# Patient Record
Sex: MALE | Race: WHITE | HISPANIC OR LATINO | Employment: FULL TIME | URBAN - METROPOLITAN AREA
[De-identification: names, ages, dates, MRNs, and addresses within clinical notes are randomized per-mention and may not be internally consistent; named-entity substitution may affect disease eponyms.]

---

## 2021-03-05 ENCOUNTER — HOSPITAL ENCOUNTER (OUTPATIENT)
Dept: RADIOLOGY | Facility: MEDICAL CENTER | Age: 46
End: 2021-03-05
Attending: PHYSICIAN ASSISTANT
Payer: COMMERCIAL

## 2021-03-05 ENCOUNTER — OFFICE VISIT (OUTPATIENT)
Dept: URGENT CARE | Facility: PHYSICIAN GROUP | Age: 46
End: 2021-03-05
Payer: COMMERCIAL

## 2021-03-05 VITALS
RESPIRATION RATE: 20 BRPM | TEMPERATURE: 98.7 F | WEIGHT: 205 LBS | HEART RATE: 102 BPM | SYSTOLIC BLOOD PRESSURE: 108 MMHG | OXYGEN SATURATION: 97 % | HEIGHT: 73 IN | DIASTOLIC BLOOD PRESSURE: 84 MMHG | BODY MASS INDEX: 27.17 KG/M2

## 2021-03-05 DIAGNOSIS — R10.84 GENERALIZED ABDOMINAL PAIN: ICD-10-CM

## 2021-03-05 DIAGNOSIS — R11.14 BILIOUS VOMITING WITH NAUSEA: ICD-10-CM

## 2021-03-05 DIAGNOSIS — K59.00 CONSTIPATION, UNSPECIFIED CONSTIPATION TYPE: ICD-10-CM

## 2021-03-05 DIAGNOSIS — E86.0 DEHYDRATION: ICD-10-CM

## 2021-03-05 PROCEDURE — 99204 OFFICE O/P NEW MOD 45 MIN: CPT | Performed by: PHYSICIAN ASSISTANT

## 2021-03-05 PROCEDURE — 74018 RADEX ABDOMEN 1 VIEW: CPT

## 2021-03-05 RX ORDER — ORAL SEMAGLUTIDE 3 MG/1
TABLET ORAL
COMMUNITY
End: 2021-10-08

## 2021-03-05 RX ORDER — METFORMIN HYDROCHLORIDE 500 MG/1
500 TABLET, EXTENDED RELEASE ORAL 2 TIMES DAILY
COMMUNITY
Start: 2021-01-19 | End: 2021-12-17

## 2021-03-05 RX ORDER — LISINOPRIL 20 MG/1
TABLET ORAL
COMMUNITY
Start: 2021-01-14 | End: 2021-10-22 | Stop reason: SDUPTHER

## 2021-03-05 RX ORDER — ONDANSETRON 4 MG/1
4 TABLET, ORALLY DISINTEGRATING ORAL EVERY 6 HOURS PRN
Qty: 10 TABLET | Refills: 0 | Status: SHIPPED | OUTPATIENT
Start: 2021-03-05 | End: 2021-10-08

## 2021-03-05 RX ORDER — OMEPRAZOLE 40 MG/1
40 CAPSULE, DELAYED RELEASE ORAL DAILY
Qty: 15 CAPSULE | Refills: 0 | Status: SHIPPED | OUTPATIENT
Start: 2021-03-05 | End: 2021-10-08

## 2021-03-05 RX ORDER — INSULIN DEGLUDEC 100 U/ML
INJECTION, SOLUTION SUBCUTANEOUS
COMMUNITY
Start: 2021-01-15 | End: 2021-10-08

## 2021-03-05 RX ORDER — ONDANSETRON 4 MG/1
TABLET, ORALLY DISINTEGRATING ORAL
COMMUNITY
Start: 2021-02-22 | End: 2021-10-08

## 2021-03-05 RX ORDER — POLYETHYLENE GLYCOL 3350 17 G/17G
POWDER, FOR SOLUTION ORAL
COMMUNITY
Start: 2021-02-22 | End: 2021-10-08

## 2021-03-05 ASSESSMENT — ENCOUNTER SYMPTOMS
WHEEZING: 0
SHORTNESS OF BREATH: 0
COUGH: 0
DIARRHEA: 0
CHILLS: 1
PALPITATIONS: 1
ABDOMINAL PAIN: 1
NAUSEA: 1
FEVER: 0
HEADACHES: 0
CONSTIPATION: 1
VOMITING: 1

## 2021-03-05 NOTE — LETTER
March 5, 2021         Patient: Osiel Lam   YOB: 1975   Date of Visit: 3/5/2021           To Whom it May Concern:    Osiel Lam was seen in my clinic on 3/5/2021. Please excuse him from work today.    If you have any questions or concerns, please don't hesitate to call.        Sincerely,           Lucy Jones P.A.-C.  Electronically Signed

## 2021-03-05 NOTE — PROGRESS NOTES
"Subjective:   Osiel Lam is a 45 y.o. male who presents for Other (vomitting (2x days); onset for a month)      HPI  45 y.o. male presents to urgent care with new problem to provider of epigastric abdominal pain, nausea, and vomiting x 2 days.   Reports recent hx of similar. [place] [facility] for new onset diabetes.   Blood sugar was 150 this morning.   Denies hematemesis or blood in stools.   Decreased appetite. Zofran this morning with good relief.   Constipation, last bowel movement was 2 days ago. Took miralax this morning   Denies other associated aggravating or alleviating factors.     Review of Systems   Constitutional: Positive for chills and malaise/fatigue. Negative for fever.   Respiratory: Negative for cough, shortness of breath and wheezing.    Cardiovascular: Positive for palpitations. Negative for chest pain.   Gastrointestinal: Positive for abdominal pain, constipation, nausea and vomiting. Negative for diarrhea.   Neurological: Negative for headaches.       There is no problem list on file for this patient.    History reviewed. No pertinent surgical history.  Social History     Tobacco Use   • Smoking status: Current Every Day Smoker   • Smokeless tobacco: Never Used   Substance Use Topics   • Alcohol use: Not Currently   • Drug use: Not Currently      History reviewed. No pertinent family history.   (Allergies, Medications, & Tobacco/Substance Use were reconciled by the Medical Assistant and reviewed by myself. The family history is prepopulated)     Objective:     /84 (BP Location: Left arm, Patient Position: Sitting, BP Cuff Size: Adult)   Pulse (!) 102   Temp 37.1 °C (98.7 °F) (Temporal)   Resp 20   Ht 1.854 m (6' 1\") Comment: per pt  Wt 93 kg (205 lb) Comment: per pt  SpO2 97%   BMI 27.05 kg/m²     Physical Exam  Vitals reviewed.   Constitutional:       General: He is not in acute distress.     Appearance: Normal appearance. He is " well-developed. He is not ill-appearing or diaphoretic.   HENT:      Head: Normocephalic and atraumatic.      Mouth/Throat:      Mouth: Mucous membranes are moist.      Pharynx: Oropharynx is clear.   Eyes:      General: No scleral icterus.     Conjunctiva/sclera: Conjunctivae normal.   Cardiovascular:      Rate and Rhythm: Normal rate and regular rhythm.      Heart sounds: Normal heart sounds.   Pulmonary:      Effort: Pulmonary effort is normal. No respiratory distress.      Breath sounds: Normal breath sounds.   Abdominal:      General: Abdomen is flat. Bowel sounds are increased.      Palpations: Abdomen is soft.      Tenderness: There is generalized abdominal tenderness.   Musculoskeletal:      Cervical back: Normal range of motion and neck supple.   Skin:     General: Skin is warm and dry.      Coloration: Skin is pale. Skin is not jaundiced.   Neurological:      General: No focal deficit present.      Mental Status: He is alert and oriented to person, place, and time.   Psychiatric:         Mood and Affect: Mood normal.         Behavior: Behavior normal.         Thought Content: Thought content normal.         Judgment: Judgment normal.         Assessment/Plan:     1. Generalized abdominal pain  [id number] VIEW    omeprazole (PRILOSEC) 40 MG delayed-release capsule   2. Bilious vomiting with nausea  ondansetron (ZOFRAN ODT) 4 MG TABLET DISPERSIBLE   3. Constipation, unspecified constipation type     4. Dehydration         Narrative & Impression        3/5/2021 9:26 AM     HISTORY/REASON FOR EXAM:  Abdominal Pain.     TECHNIQUE/EXAM DESCRIPTION AND NUMBER OF VIEWS:  1 view(s) of the abdomen.     COMPARISON: None     FINDINGS:  Nonobstructive bowel gas pattern. Small to moderate amount of stool throughout the colon.  No signs of free air.  No abnormal calcifications projecting over the field of view.  No acute fracture.     IMPRESSION:     Nonobstructive bowel gas pattern. Small to moderate amount of stool  throughout the colon.     Blood glucose was 150 just prior to arrival. Patient took previously prescribed Zofran and states he is feeling much better. No episodes of vomiting in urgent care. Recommend patient drink plenty of fluids and electrolyte replacement. Discussed red flags and reasons to proceed to ED including increased blood glucose, vomiting, fevers, abdominal pain, change in bowel habits, or change in mentation.     Differential diagnosis, natural history, supportive care, and indications for immediate follow-up discussed.    Advised the patient to follow-up with the primary care physician for recheck, reevaluation, and consideration of further management.  Patient verbalized understanding of treatment plan and has no further questions regarding care.     Please note that this dictation was created using voice recognition software. I have made a reasonable attempt to correct obvious errors, but I expect that there are errors of grammar and possibly content that I did not discover before finalizing the note.    This note was electronically signed by Lucy Jones PA-C

## 2021-03-17 ENCOUNTER — OFFICE VISIT (OUTPATIENT)
Dept: URGENT CARE | Facility: PHYSICIAN GROUP | Age: 46
End: 2021-03-17
Payer: COMMERCIAL

## 2021-03-17 VITALS
TEMPERATURE: 97.4 F | WEIGHT: 205 LBS | OXYGEN SATURATION: 96 % | HEIGHT: 73 IN | DIASTOLIC BLOOD PRESSURE: 98 MMHG | BODY MASS INDEX: 27.17 KG/M2 | SYSTOLIC BLOOD PRESSURE: 140 MMHG | RESPIRATION RATE: 14 BRPM | HEART RATE: 82 BPM

## 2021-03-17 DIAGNOSIS — R11.2 CANNABINOID HYPEREMESIS SYNDROME: ICD-10-CM

## 2021-03-17 DIAGNOSIS — E11.9 TYPE 2 DIABETES MELLITUS WITHOUT COMPLICATION, UNSPECIFIED WHETHER LONG TERM INSULIN USE (HCC): ICD-10-CM

## 2021-03-17 DIAGNOSIS — F12.90 CANNABINOID HYPEREMESIS SYNDROME: ICD-10-CM

## 2021-03-17 PROCEDURE — 99214 OFFICE O/P EST MOD 30 MIN: CPT | Performed by: PHYSICIAN ASSISTANT

## 2021-03-17 RX ORDER — PROCHLORPERAZINE MALEATE 5 MG/1
5 TABLET ORAL EVERY 8 HOURS PRN
Qty: 20 TABLET | Refills: 0 | Status: SHIPPED | OUTPATIENT
Start: 2021-03-17 | End: 2021-10-08

## 2021-03-17 ASSESSMENT — ENCOUNTER SYMPTOMS
FEVER: 0
VOMITING: 1
BLOOD IN STOOL: 0
NAUSEA: 1
PALPITATIONS: 0
CHILLS: 0
SPUTUM PRODUCTION: 0
ABDOMINAL PAIN: 1
HEADACHES: 0
NECK PAIN: 0
COUGH: 0
DIZZINESS: 0
MYALGIAS: 0
WHEEZING: 0
CONSTIPATION: 0
BACK PAIN: 0
DIARRHEA: 0
FLANK PAIN: 0

## 2021-03-17 NOTE — PROGRESS NOTES
Subjective:   Osiel Lam is a 45 y.o. male who presents for Emesis (vomiting x2 months)      HPI  45 y.o. male presents to urgent care complaining of nausea, vomiting, and generalized upper abdominal pain x 2 months. Patient was seen in ED about 2 weeks ago and I treated him for similar complaints with zofran and omperazole. Patient was recently diagnosed with type 2 diabetes and is using insulin. He has just established care with PCP and has had 1 follow up appointment since starting insulin treatment prescribed by [facility] emergency department.  Patient takes insulin in the morning.  He said his blood sugars have been consistent so we around 150.  He denies blood in stool or hematemesis.  Patient denies fevers.  No urinary symptoms.  Patient reports history of marijuana use and cigarette smoking daily.   He uses NSAIDs occasionally and does not drink alcohol.  Denies hx of abdominal surgeries.   Denies other associated aggravating or alleviating factors.     Review of Systems   Constitutional: Positive for malaise/fatigue. Negative for chills and fever.   Respiratory: Negative for cough, sputum production and wheezing.    Cardiovascular: Negative for chest pain and palpitations.   Gastrointestinal: Positive for abdominal pain, nausea and vomiting. Negative for blood in stool, constipation, diarrhea and melena.   Genitourinary: Negative for dysuria, flank pain and hematuria.   Musculoskeletal: Negative for back pain, myalgias and neck pain.   Neurological: Negative for dizziness and headaches.       There is no problem list on file for this patient.    History reviewed. No pertinent surgical history.  Social History     Tobacco Use   • Smoking status: Current Every Day Smoker   • Smokeless tobacco: Never Used   Substance Use Topics   • Alcohol use: Not Currently   • Drug use: Yes     Types: Marijuana     Comment: daily      History reviewed. No pertinent family history.   (Allergies, Medications, &  "Tobacco/Substance Use were reconciled by the Medical Assistant and reviewed by myself. The family history is prepopulated)     Objective:     /98 (BP Location: Right arm, Patient Position: Sitting, BP Cuff Size: Adult)   Pulse 82   Temp 36.3 °C (97.4 °F) (Temporal)   Resp 14   Ht 1.854 m (6' 1\")   Wt 93 kg (205 lb)   SpO2 96%   BMI 27.05 kg/m²     Physical Exam  Vitals reviewed.   Constitutional:       General: He is not in acute distress.     Appearance: Normal appearance. He is well-developed. He is ill-appearing.   HENT:      Head: Normocephalic and atraumatic.      Mouth/Throat:      Mouth: Mucous membranes are dry.      Pharynx: Oropharynx is clear.   Eyes:      Conjunctiva/sclera: Conjunctivae normal.   Cardiovascular:      Rate and Rhythm: Normal rate and regular rhythm.      Heart sounds: Normal heart sounds.   Pulmonary:      Effort: Pulmonary effort is normal. No respiratory distress.      Breath sounds: Normal breath sounds.   Abdominal:      General: Abdomen is flat. There is no distension.      Palpations: There is no mass.      Tenderness: There is generalized abdominal tenderness and tenderness in the epigastric area and left upper quadrant. There is no right CVA tenderness, left CVA tenderness, guarding or rebound.   Musculoskeletal:      Cervical back: Normal range of motion and neck supple.   Skin:     General: Skin is warm and dry.      Coloration: Skin is jaundiced and pale.   Neurological:      General: No focal deficit present.      Mental Status: He is alert and oriented to person, place, and time.   Psychiatric:         Mood and Affect: Mood normal.         Behavior: Behavior normal.         Thought Content: Thought content normal.         Judgment: Judgment normal.         Assessment/Plan:     1. Cannabinoid hyperemesis syndrome  REFERRAL TO GASTROENTEROLOGY    prochlorperazine (COMPAZINE) 5 MG Tab    CANCELED: REFERRAL TO GASTROENTEROLOGY   2. Type 2 diabetes mellitus without " complication, unspecified whether long term insulin use (HCC)  REFERRAL TO GASTROENTEROLOGY    CANCELED: REFERRAL TO GASTROENTEROLOGY     Recommend follow up with PCP and GI.   DDX includes gastritis, PUD, upper/lower GI bleeding, adverse medication reaction to insulin, hypo/hyperglycemia, and hyperemesis due to daily use of mariajuana.   Patient has trailed zofran with some relief of vomiting, I will send compazine instead. Recommend he continue to take omeprazole as previously prescribed.  Drink plenty of fluids and rest.  Stop cannabis use.  ED and return precautions given for worsening symptoms or development of symptoms including but not limited to abdominal pain, upper or lower GI bleeding, fevers.    Differential diagnosis, natural history, supportive care, and indications for immediate follow-up discussed.    Advised the patient to follow-up with the primary care physician for recheck, reevaluation, and consideration of further management.  Patient verbalized understanding of treatment plan and has no further questions regarding care.     Please note that this dictation was created using voice recognition software. I have made a reasonable attempt to correct obvious errors, but I expect that there are errors of grammar and possibly content that I did not discover before finalizing the note.    This note was electronically signed by Lucy Jones PA-C

## 2021-03-18 ENCOUNTER — OFFICE VISIT (OUTPATIENT)
Dept: URGENT CARE | Facility: PHYSICIAN GROUP | Age: 46
End: 2021-03-18
Payer: COMMERCIAL

## 2021-03-18 VITALS
SYSTOLIC BLOOD PRESSURE: 110 MMHG | WEIGHT: 192 LBS | BODY MASS INDEX: 25.45 KG/M2 | RESPIRATION RATE: 20 BRPM | TEMPERATURE: 98.2 F | OXYGEN SATURATION: 98 % | HEIGHT: 73 IN | HEART RATE: 90 BPM | DIASTOLIC BLOOD PRESSURE: 88 MMHG

## 2021-03-18 DIAGNOSIS — E11.9 TYPE 2 DIABETES MELLITUS WITHOUT COMPLICATION, UNSPECIFIED WHETHER LONG TERM INSULIN USE (HCC): ICD-10-CM

## 2021-03-18 DIAGNOSIS — R10.13 EPIGASTRIC PAIN: ICD-10-CM

## 2021-03-18 DIAGNOSIS — R11.2 INTRACTABLE VOMITING WITH NAUSEA, UNSPECIFIED VOMITING TYPE: ICD-10-CM

## 2021-03-18 LAB — GLUCOSE BLD-MCNC: 145 MG/DL (ref 70–100)

## 2021-03-18 PROCEDURE — 82962 GLUCOSE BLOOD TEST: CPT | Performed by: PHYSICIAN ASSISTANT

## 2021-03-18 PROCEDURE — 99214 OFFICE O/P EST MOD 30 MIN: CPT | Performed by: PHYSICIAN ASSISTANT

## 2021-03-18 ASSESSMENT — ENCOUNTER SYMPTOMS
NAUSEA: 1
VOMITING: 1
ABDOMINAL PAIN: 1
FEVER: 0
CHILLS: 0

## 2021-03-18 NOTE — PROGRESS NOTES
Subjective:   Osiel Lam is a 45 y.o. male who presents for Emesis (Vomitting( all last night), stomach pain,)        Patient presents with his father today.  He would like to be reevaluated for his vomiting.  He states that he has had symptoms for the last 2 months and has been evaluated twice in urgent care for this.  He was seen yesterday and prescribed Phenergan for symptoms.  Prior to this he was prescribed Zofran.  Despite these medications he was vomiting all evening and is not tolerating oral intake.  He also endorses epigastric pain.  He was recently diagnosed with type 2 diabetes and is currently taking insulin and only insulin for this.  This was initially diagnosed at [facility] ED.  He does use marijuana daily and has history of cannabinoid hyperemesis syndrome.    Review of Systems   Constitutional: Positive for malaise/fatigue. Negative for chills and fever.   Gastrointestinal: Positive for abdominal pain, nausea and vomiting.       PMH:  has no past medical history on file.  MEDS:   Current Outpatient Medications:   •  prochlorperazine (COMPAZINE) 5 MG Tab, Take 1 tablet by mouth every 8 hours as needed for Nausea/Vomiting., Disp: 20 tablet, Rfl: 0  •  lisinopril (PRINIVIL) 20 MG Tab, TAKE 1 TABLET BY MOUTH DAILY, Disp: , Rfl:   •  TRESIBA FLEXTOUCH 100 UNIT/ML Solution Pen-injector, ADMINISTER 10 UNITS UNDER THE SKIN DAILY, Disp: , Rfl:   •  ondansetron (ZOFRAN ODT) 4 MG TABLET DISPERSIBLE, TAKE 1 TABLET BY MOUTH EVERY 8 HOURS AS NEEDED FORNAUSEA AND VOMITING, Disp: , Rfl:   •  polyethylene glycol 3350 (MIRALAX) 17 GM/SCOOP Powder, MIX AND DRINK 17GM BY MOUTH EVERY DAY FOR 14 DAYS, Disp: , Rfl:   •  Semaglutide (RYBELSUS) 3 MG Tab, Take  by mouth., Disp: , Rfl:   •  metFORMIN ER (GLUCOPHAGE XR) 500 MG TABLET SR 24 HR, Take 1,000 mg by mouth., Disp: , Rfl:   •  omeprazole (PRILOSEC) 40 MG delayed-release capsule, Take 1 capsule by mouth every day., Disp: 15 capsule, Rfl: 0  •  ondansetron  "(ZOFRAN ODT) 4 MG TABLET DISPERSIBLE, Take 1 tablet by mouth every 6 hours as needed for Nausea., Disp: 10 tablet, Rfl: 0  ALLERGIES: No Known Allergies  SURGHX: History reviewed. No pertinent surgical history.  SOCHX:  reports that he has been smoking. He has never used smokeless tobacco. He reports previous alcohol use. He reports current drug use. Drug: Marijuana.  FH: Family history was reviewed, no pertinent findings to report   Objective:   /88 (BP Location: Left arm, Patient Position: Sitting, BP Cuff Size: Adult)   Pulse 90   Temp 36.8 °C (98.2 °F) (Temporal)   Resp 20   Ht 1.854 m (6' 1\")   Wt 87.1 kg (192 lb)   SpO2 98%   BMI 25.33 kg/m²   Physical Exam  Vitals reviewed.   Constitutional:       General: He is not in acute distress.     Appearance: Normal appearance. He is well-developed. He is ill-appearing.   HENT:      Head: Normocephalic and atraumatic.      Right Ear: External ear normal.      Left Ear: External ear normal.      Nose: Nose normal.   Eyes:      General: Gaze aligned appropriately.   Cardiovascular:      Rate and Rhythm: Normal rate and regular rhythm.   Pulmonary:      Effort: Pulmonary effort is normal. No respiratory distress.      Breath sounds: No stridor.   Abdominal:      General: Abdomen is flat.      Palpations: Abdomen is soft.      Tenderness: There is abdominal tenderness in the epigastric area. There is no guarding or rebound.   Musculoskeletal:      Cervical back: Neck supple.   Skin:     General: Skin is warm and dry.      Capillary Refill: Capillary refill takes less than 2 seconds.      Coloration: Skin is pale.   Neurological:      Mental Status: He is alert and oriented to person, place, and time.      Comments: CN2-12 grossly intact   Psychiatric:         Speech: Speech normal.         Behavior: Behavior normal.           Results for orders placed or performed in visit on 03/18/21   POCT Glucose   Result Value Ref Range    Glucose - Accu-Ck 145 (A) 70 - " 100 mg/dL       Assessment/Plan:   1. Intractable vomiting with nausea, unspecified vomiting type    2. Epigastric pain    3. Type 2 diabetes mellitus without complication, unspecified whether long term insulin use (HCC)  - POCT Glucose    Patient records reviewed.  He was just seen in clinic yesterday for symptoms.  He was recently diagnosed with type 2 diabetes and is currently on insulin.  Semaglutide and metformin are listed on his medications, but it is not clear if he is still taking these.  He was previously diagnosed with cannabinoid hyperemesis syndrome.  His point-of-care glucose testing is 145 today.    Considerations include but not limited to euglycemic ketoacidosis, pancreatitis, biliary pathologies, cannabinoid hyperemesis syndrome, PUD, H. pylori, gastritis.    Patient is ill-appearing and not tolerating oral intake.  He has had intractable vomiting for the last 24 hours and failed outpatient therapy.  I would like patient to be reevaluated at a higher level of care.  Patient and his father are amenable to this plan will go to [facility] ED for further evaluation.      Differential diagnosis, natural history, supportive care, and indications for immediate follow-up discussed.

## 2021-03-18 NOTE — LETTER
March 18, 2021    To Whom It May Concern:         This is confirmation that Osiel Lam attended his scheduled appointment with Ashvin Beltre P.A.-C. on 3/18/21. Please excuse him from work today.         If you have any questions please do not hesitate to call me at the phone number listed below.    Sincerely,          Ashvin Beltre P.A.-C.  [phone]

## 2021-10-08 ENCOUNTER — OFFICE VISIT (OUTPATIENT)
Dept: MEDICAL GROUP | Facility: PHYSICIAN GROUP | Age: 46
End: 2021-10-08
Payer: COMMERCIAL

## 2021-10-08 VITALS
DIASTOLIC BLOOD PRESSURE: 84 MMHG | HEART RATE: 100 BPM | WEIGHT: 186 LBS | OXYGEN SATURATION: 96 % | SYSTOLIC BLOOD PRESSURE: 132 MMHG | HEIGHT: 72 IN | BODY MASS INDEX: 25.19 KG/M2 | TEMPERATURE: 98.8 F | RESPIRATION RATE: 20 BRPM

## 2021-10-08 DIAGNOSIS — Z23 NEED FOR VACCINATION: ICD-10-CM

## 2021-10-08 DIAGNOSIS — Z79.4 TYPE 2 DIABETES MELLITUS WITHOUT COMPLICATION, WITH LONG-TERM CURRENT USE OF INSULIN (HCC): ICD-10-CM

## 2021-10-08 DIAGNOSIS — E11.9 TYPE 2 DIABETES MELLITUS WITHOUT COMPLICATION, WITH LONG-TERM CURRENT USE OF INSULIN (HCC): ICD-10-CM

## 2021-10-08 DIAGNOSIS — I10 PRIMARY HYPERTENSION: ICD-10-CM

## 2021-10-08 PROCEDURE — 90686 IIV4 VACC NO PRSV 0.5 ML IM: CPT | Performed by: NURSE PRACTITIONER

## 2021-10-08 PROCEDURE — 90471 IMMUNIZATION ADMIN: CPT | Performed by: NURSE PRACTITIONER

## 2021-10-08 PROCEDURE — 99214 OFFICE O/P EST MOD 30 MIN: CPT | Mod: 25 | Performed by: NURSE PRACTITIONER

## 2021-10-08 RX ORDER — INSULIN DEGLUDEC 100 U/ML
INJECTION, SOLUTION SUBCUTANEOUS
Qty: 15 ML | Refills: 1 | Status: SHIPPED | OUTPATIENT
Start: 2021-10-08 | End: 2021-10-22

## 2021-10-08 RX ORDER — SUCRALFATE 1 G/1
TABLET ORAL
COMMUNITY
Start: 2021-08-11 | End: 2021-10-08

## 2021-10-08 ASSESSMENT — PATIENT HEALTH QUESTIONNAIRE - PHQ9: CLINICAL INTERPRETATION OF PHQ2 SCORE: 0

## 2021-10-08 NOTE — LETTER
October 8, 2021    To Whom It May Concern:         This is confirmation that Gulshan A Rendon attended his scheduled appointment with DINORA Granados on 10/08/21. Due to his acute illness he was absent from work on 10/06/21, 10/07/21, and 10/08/21.         If you have any questions please do not hesitate to call me at the phone number listed below.    Sincerely,          GRANT GranadosRCarinaN.  [phone]

## 2021-10-08 NOTE — ASSESSMENT & PLAN NOTE
Chronic, ongoing.  He indicates that he is feeling well and denies any symptoms referable to this diagnoses. Specifically denies chest pain, palpitations, dyspnea, orthopnea, PND or peripheral edema, polyuria, polydipsia, urinary complaints, abdominal complaints, myalgias, numbness, weakness or other related symptoms.   Medications: Metformin and Tresiba.  Glucose at home: Unknown.  Hypoglycemia events: Denies.  Statin: None.  ACEI/ARB: Lisinopril but currently not taking.  ASA: No.  Exercise: None.  Last eye exam: Unknown. Denies visual blurring, double vision, eye pain and floaters.  Last monofilament foot exam: Unknown. Denies foot pain, numbness, calluses, ulcers.  Diabetic complications: none  A1c: No results found for: HBA1C   Due for updated lab work.

## 2021-10-08 NOTE — PROGRESS NOTES
"Subjective  Chief Complaint  Establish care to manage his chronic conditions    History of Present Illness  Gulshan Lam is a 46 y.o. male. This patient is here today to establish care.    Primary hypertension  Currently not taking his prescribed Lisinopril. He states that he decided to stop taking it awhile ago.  Reports diet is \"okay\".   He is not following a low-salt diet.  He is not exercising regularly.  He is not taking baby aspirin daily.   He is not monitoring BP at home.   Denies symptoms low BP: light-headed, tunnel-vision, unusual fatigue, dizziness.  Denies symptoms high BP: pounding headache, visual changes, palpitations, flushed face.   Denies chest pain, shortness of breath, dyspnea on exertion.      Type 2 diabetes mellitus, with long-term current use of insulin (HCC)  Chronic, ongoing.  He indicates that he is feeling well and denies any symptoms referable to this diagnoses. Specifically denies chest pain, palpitations, dyspnea, orthopnea, PND or peripheral edema, polyuria, polydipsia, urinary complaints, abdominal complaints, myalgias, numbness, weakness or other related symptoms.   Medications: Metformin and Tresiba.  Glucose at home: Unknown.  Hypoglycemia events: Denies.  Statin: None.  ACEI/ARB: Lisinopril but currently not taking.  ASA: No.  Exercise: None.  Last eye exam: Unknown. Denies visual blurring, double vision, eye pain and floaters.  Last monofilament foot exam: Unknown. Denies foot pain, numbness, calluses, ulcers.  Diabetic complications: none  A1c: No results found for: HBA1C   Due for updated lab work.      Past Medical History    Allergies: Patient has no known allergies.  History reviewed. No pertinent past medical history.  History reviewed. No pertinent surgical history.  Current Outpatient Medications Ordered in Epic   Medication Sig Dispense Refill   • Insulin Degludec (TRESIBA FLEXTOUCH) 100 UNIT/ML Solution Pen-injector ADMINSTER 10 UNITS UNDER THE SKIN DAILY 15 mL " 1   • lisinopril (PRINIVIL) 20 MG Tab TAKE 1 TABLET BY MOUTH DAILY     • metFORMIN ER (GLUCOPHAGE XR) 500 MG TABLET SR 24 HR Take 500 mg by mouth 2 times a day. Indications: Type 2 Diabetes       No current Epic-ordered facility-administered medications on file.     Family History:    Family History   Problem Relation Age of Onset   • Hypertension Mother    • Diabetes Father    • Hypertension Father    • Lung Disease Neg Hx    • Cancer Neg Hx       Personal/Social History:    Social History     Tobacco Use   • Smoking status: Current Every Day Smoker   • Smokeless tobacco: Current User     Types: Chew   • Tobacco comment: states that he quit 2 days ago.   Vaping Use   • Vaping Use: Never used   Substance Use Topics   • Alcohol use: Not Currently   • Drug use: Not Currently     Social History     Social History Narrative   • Not on file      Review of Systems:     General: Negative for fever/chills and unexpected weight change.    Eyes:  Negative for vision changes, eye pain.   Respiratory:  Negative for cough and dyspnea.     Cardiovascular:  Negative for chest pain and palpitations.   Genitourinary:  Negative for dysuria and hematuria.    Musculoskeletal:  Negative for myalgias.    Skin:  Negative for rash.    Neurological:  Negative for numbness/tingling and headaches.     Objective  Physical Exam:   /84 (BP Location: Left arm, Patient Position: Sitting, BP Cuff Size: Adult)   Pulse 100   Temp 37.1 °C (98.8 °F) (Temporal)   Resp 20   Ht 1.829 m (6')   Wt 84.4 kg (186 lb)   SpO2 96%  Body mass index is 25.23 kg/m².  General:  Alert and oriented.  Well appearing.  NAD.  Head:  Normocephalic.   Eyes:  Eyes conjunctiva clear lids without ptosis, pupils equal and reactive to light accommodation.    Neck: Supple without JVD. No lymphadenopathy.  Pulmonary:  Normal effort.  Clear to ausculation without rales, ronchi, or wheezing.  Cardiovascular:  Regular rate and rhythm without murmur, rubs or gallop.   Radial pulses are intact and equal bilaterally.  Musculoskeletal:  No extremity cyanosis, clubbing, or edema.  Skin:  Warm and dry.  No obvious lesions.  Psych: Normal mood and affect. Alert and oriented x3. Judgment and insight is normal.      Assessment/Plan   1. Primary hypertension  Chronic and ongoing.  Currently not taking his prescribed Lisinopril 20 mg daily.  Discussed with him that he needs to be taking his Lisinopril 20 mg daily. He verbalized understanding and he will start taking Lisinopril 20 mg daily.  Educated on a healthy diet and exercise.  - Comp Metabolic Panel; Future    2. Type 2 diabetes mellitus without complication, with long-term current use of insulin (HCC)  Chronic and ongoing.  He states that he was in the hospital at [facility] in January 2021, he was hospitalize for extremely high blood sugars and diagnosed with Type 2 diabetes. Records will be requested from [facility].  He is needing a refill of his Tresiba Flextouch. Continue to administer 10 units under the skin daily in the morning.  Continue to take Metformin  mg twice a day.  Due for updated labs. Discussed with him that I would like him to get these labs done within the next week since this is the first time he is following up with a PCP about his Diabetes since his initial diagnosis in the hospital in January. He verbalized understanding and will get the lab work done soon.  Referral to Endocrinology placed at this time.  - Insulin Degludec (TRESIBA FLEXTOUCH) 100 UNIT/ML Solution Pen-injector; ADMINSTER 10 UNITS UNDER THE SKIN DAILY  Dispense: 15 mL; Refill: 1  - Comp Metabolic Panel; Future  - HEMOGLOBIN A1C; Future  - Lipid Profile; Future  - Microalbumin Creat Ratio Urine (Lab Collect); Future  - REFERRAL TO ENDOCRINOLOGY    3. Need for vaccination  - INFLUENZA VACCINE QUAD INJ (PF)      Health Maintenance: Discussed with the patient.    Return in about 2 weeks (around 10/22/2021) for F/U Labs.    I have  placed the above orders and discussed them with an approved delegating provider.  The MA is performing the below orders under the direction of Dr. Rochelle Dos Santos MD/.     Please note that this dictation was created using voice recognition software. I have made every reasonable attempt to correct obvious errors, but I expect that there are errors of grammar and possibly content that I did not discover before finalizing the note.    ERA Bledsoe  [facility]

## 2021-10-08 NOTE — LETTER
[facility], [facility]  DINORA Granados  [address]  [facility] [address]  Fax: [phone]   Authorization for Release/Disclosure of   Protected Health Information   Name: CARROLL CORONEL : 1975 SSN: [id number]   Address: [address] [place] [address] Phone:    [phone] (home)    I authorize the entity listed below to release/disclose the PHI below to:   [facility]/DINORA Granados and DINORA Granados   Provider or Entity Name:                                           [facility]    Address   City, [facility], [facility]   Phone:  [phone]    Fax:  [phone]   Reason for request: continuity of care   Information to be released:    [  ] LAST COLONOSCOPY,  including any PATH REPORT and follow-up  [  ] LAST FIT/COLOGUARD RESULT [  ] LAST DEXA  [  ] LAST MAMMOGRAM  [  ] LAST PAP  [  ] LAST LABS [  ] RETINA EXAM REPORT  [  ] IMMUNIZATION RECORDS  [XX] Release all info      [XX] Check here and initial the line next to each item to release ALL health information INCLUDING  _____ Care and treatment for drug and / or alcohol abuse  _____ HIV testing, infection status, or AIDS  _____ Genetic Testing    DATES OF SERVICE OR TIME PERIOD TO BE DISCLOSED: _____________  I understand and acknowledge that:  * This Authorization may be revoked at any time by you in writing, except if your health information has already been used or disclosed.  * Your health information that will be used or disclosed as a result of you signing this authorization could be re-disclosed by the recipient. If this occurs, your re-disclosed health information may no longer be protected by State or Federal laws.  * You may refuse to sign this Authorization. Your refusal will not affect your ability to obtain treatment.  * This Authorization becomes effective upon signing and will  on (date) __________.      If no date is indicated, this Authorization will  one (1)  year from the signature date.    Name: Gulshan Lam    Signature: Continuity of care   Date:     10/8/2021       PLEASE FAX REQUESTED RECORDS BACK TO: [phone]

## 2021-10-08 NOTE — ASSESSMENT & PLAN NOTE
"Currently not taking his prescribed Lisinopril. He states that he decided to stop taking it awhile ago.  Reports diet is \"okay\".   He is not following a low-salt diet.  He is not exercising regularly.  He is not taking baby aspirin daily.   He is not monitoring BP at home.   Denies symptoms low BP: light-headed, tunnel-vision, unusual fatigue, dizziness.  Denies symptoms high BP: pounding headache, visual changes, palpitations, flushed face.   Denies chest pain, shortness of breath, dyspnea on exertion.    "

## 2021-10-11 ENCOUNTER — HOSPITAL ENCOUNTER (OUTPATIENT)
Dept: LAB | Facility: MEDICAL CENTER | Age: 46
End: 2021-10-11
Attending: NURSE PRACTITIONER
Payer: COMMERCIAL

## 2021-10-11 DIAGNOSIS — I10 PRIMARY HYPERTENSION: ICD-10-CM

## 2021-10-11 DIAGNOSIS — E11.9 TYPE 2 DIABETES MELLITUS WITHOUT COMPLICATION, WITH LONG-TERM CURRENT USE OF INSULIN (HCC): ICD-10-CM

## 2021-10-11 DIAGNOSIS — Z79.4 TYPE 2 DIABETES MELLITUS WITHOUT COMPLICATION, WITH LONG-TERM CURRENT USE OF INSULIN (HCC): ICD-10-CM

## 2021-10-11 LAB
ALBUMIN SERPL BCP-MCNC: 4.6 G/DL (ref 3.2–4.9)
ALBUMIN/GLOB SERPL: 1.7 G/DL
ALP SERPL-CCNC: 67 U/L (ref 30–99)
ALT SERPL-CCNC: 12 U/L (ref 2–50)
ANION GAP SERPL CALC-SCNC: 11 MMOL/L (ref 7–16)
AST SERPL-CCNC: 14 U/L (ref 12–45)
BILIRUB SERPL-MCNC: 0.4 MG/DL (ref 0.1–1.5)
BUN SERPL-MCNC: 8 MG/DL (ref 8–22)
CALCIUM SERPL-MCNC: 9.1 MG/DL (ref 8.5–10.5)
CHLORIDE SERPL-SCNC: 104 MMOL/L (ref 96–112)
CHOLEST SERPL-MCNC: 118 MG/DL (ref 100–199)
CO2 SERPL-SCNC: 26 MMOL/L (ref 20–33)
CREAT SERPL-MCNC: 0.69 MG/DL (ref 0.5–1.4)
EST. AVERAGE GLUCOSE BLD GHB EST-MCNC: 111 MG/DL
FASTING STATUS PATIENT QL REPORTED: NORMAL
GLOBULIN SER CALC-MCNC: 2.7 G/DL (ref 1.9–3.5)
GLUCOSE SERPL-MCNC: 119 MG/DL (ref 65–99)
HBA1C MFR BLD: 5.5 % (ref 4–5.6)
HDLC SERPL-MCNC: 46 MG/DL
LDLC SERPL CALC-MCNC: 59 MG/DL
POTASSIUM SERPL-SCNC: 3.9 MMOL/L (ref 3.6–5.5)
PROT SERPL-MCNC: 7.3 G/DL (ref 6–8.2)
SODIUM SERPL-SCNC: 141 MMOL/L (ref 135–145)
TRIGL SERPL-MCNC: 63 MG/DL (ref 0–149)

## 2021-10-11 PROCEDURE — 36415 COLL VENOUS BLD VENIPUNCTURE: CPT

## 2021-10-11 PROCEDURE — 83036 HEMOGLOBIN GLYCOSYLATED A1C: CPT

## 2021-10-11 PROCEDURE — 80061 LIPID PANEL: CPT

## 2021-10-11 PROCEDURE — 80053 COMPREHEN METABOLIC PANEL: CPT

## 2021-10-14 ENCOUNTER — TELEPHONE (OUTPATIENT)
Dept: MEDICAL GROUP | Facility: PHYSICIAN GROUP | Age: 46
End: 2021-10-14

## 2021-10-14 NOTE — TELEPHONE ENCOUNTER
Patient notified and number provided to Endocrinology [facility]

## 2021-10-14 NOTE — TELEPHONE ENCOUNTER
----- Message from DINORA Granados sent at 10/14/2021 11:40 AM PDT -----  Regarding: Endocrinology  Please call the patient and give him the information for Endocrinology for him to schedule an appointment about his Diabetes. It may be awhile until he can get an appointment but I still want him to get an appointment even if it is in 2022.    Thank you,    ERA Bledsoe

## 2021-10-22 ENCOUNTER — OFFICE VISIT (OUTPATIENT)
Dept: MEDICAL GROUP | Facility: PHYSICIAN GROUP | Age: 46
End: 2021-10-22
Payer: COMMERCIAL

## 2021-10-22 ENCOUNTER — OFFICE VISIT (OUTPATIENT)
Dept: URGENT CARE | Facility: CLINIC | Age: 46
End: 2021-10-22
Payer: COMMERCIAL

## 2021-10-22 VITALS
SYSTOLIC BLOOD PRESSURE: 130 MMHG | HEART RATE: 100 BPM | RESPIRATION RATE: 20 BRPM | TEMPERATURE: 97.1 F | BODY MASS INDEX: 25.84 KG/M2 | DIASTOLIC BLOOD PRESSURE: 76 MMHG | WEIGHT: 195 LBS | OXYGEN SATURATION: 97 % | HEIGHT: 73 IN

## 2021-10-22 VITALS
TEMPERATURE: 98.8 F | BODY MASS INDEX: 25.47 KG/M2 | WEIGHT: 188 LBS | DIASTOLIC BLOOD PRESSURE: 72 MMHG | SYSTOLIC BLOOD PRESSURE: 136 MMHG | HEIGHT: 72 IN | RESPIRATION RATE: 16 BRPM | HEART RATE: 86 BPM | OXYGEN SATURATION: 98 %

## 2021-10-22 DIAGNOSIS — R10.9 BILATERAL FLANK PAIN: ICD-10-CM

## 2021-10-22 DIAGNOSIS — G89.29 CHRONIC ABDOMINAL PAIN: ICD-10-CM

## 2021-10-22 DIAGNOSIS — R11.2 NON-INTRACTABLE VOMITING WITH NAUSEA, UNSPECIFIED VOMITING TYPE: ICD-10-CM

## 2021-10-22 DIAGNOSIS — K21.9 GASTROESOPHAGEAL REFLUX DISEASE, UNSPECIFIED WHETHER ESOPHAGITIS PRESENT: ICD-10-CM

## 2021-10-22 DIAGNOSIS — R10.9 CHRONIC ABDOMINAL PAIN: ICD-10-CM

## 2021-10-22 DIAGNOSIS — Z79.4 TYPE 2 DIABETES MELLITUS WITHOUT COMPLICATION, WITH LONG-TERM CURRENT USE OF INSULIN (HCC): ICD-10-CM

## 2021-10-22 DIAGNOSIS — I10 PRIMARY HYPERTENSION: ICD-10-CM

## 2021-10-22 DIAGNOSIS — E11.9 TYPE 2 DIABETES MELLITUS WITHOUT COMPLICATION, WITH LONG-TERM CURRENT USE OF INSULIN (HCC): ICD-10-CM

## 2021-10-22 PROCEDURE — 99213 OFFICE O/P EST LOW 20 MIN: CPT | Performed by: NURSE PRACTITIONER

## 2021-10-22 PROCEDURE — 99214 OFFICE O/P EST MOD 30 MIN: CPT | Performed by: NURSE PRACTITIONER

## 2021-10-22 RX ORDER — LISINOPRIL 20 MG/1
20 TABLET ORAL DAILY
Qty: 90 TABLET | Refills: 1 | Status: SHIPPED | OUTPATIENT
Start: 2021-10-22 | End: 2021-12-17

## 2021-10-22 ASSESSMENT — ENCOUNTER SYMPTOMS
COUGH: 0
VOMITING: 1
SHORTNESS OF BREATH: 0
FLANK PAIN: 1
NAUSEA: 1
ABDOMINAL PAIN: 1
DIARRHEA: 0
PALPITATIONS: 0
HEARTBURN: 1
CONSTIPATION: 0
BLOOD IN STOOL: 0
FEVER: 0
CHILLS: 0

## 2021-10-22 ASSESSMENT — PAIN SCALES - GENERAL: PAINLEVEL: 10=SEVERE PAIN

## 2021-10-22 NOTE — LETTER
[facility]  ED GranadosP.RCarinaN.  [address]  [place] [address]  Fax: [phone]   Authorization for Release/Disclosure of   Protected Health Information   Name: BELKYS CORONEL : 1975 SSN: [id number]   Address: [address] [place] [address]  [place] [address] Phone:    [phone] (home)    I authorize the entity listed below to release/disclose the PHI below to:   [facility]/ABBY Granados.P.R.MAURA and DINORA Granados   Provider or Entity Name:                                              [facility]    Address   [facility], [facility], [facility]    [address] [place] [address]   Phone:   [phone]    Fax:   [phone]   Reason for request: continuity of care   Information to be released:    [  ] LAST COLONOSCOPY,  including any PATH REPORT and follow-up  [  ] LAST FIT/COLOGUARD RESULT [  ] LAST DEXA  [  ] LAST MAMMOGRAM  [  ] LAST PAP  [  ] LAST LABS [  ] RETINA EXAM REPORT  [  ] IMMUNIZATION RECORDS  [XX] Release all info      [XX] Check here and initial the line next to each item to release ALL health information INCLUDING  _____ Care and treatment for drug and / or alcohol abuse  _____ HIV testing, infection status, or AIDS  _____ Genetic Testing    DATES OF SERVICE OR TIME PERIOD TO BE DISCLOSED: _____________  I understand and acknowledge that:  * This Authorization may be revoked at any time by you in writing, except if your health information has already been used or disclosed.  * Your health information that will be used or disclosed as a result of you signing this authorization could be re-disclosed by the recipient. If this occurs, your re-disclosed health information may no longer be protected by State or Federal laws.  * You may refuse to sign this Authorization. Your refusal will not affect your ability to obtain treatment.  * This Authorization becomes effective upon signing and will  on (date) __________.       If no date is indicated, this Authorization will  one (1) year from the signature date.    Name: Bharathi Lam    Signature: Continuity of care   Date:     10/22/2021       PLEASE FAX REQUESTED RECORDS BACK TO: [phone]

## 2021-10-22 NOTE — LETTER
October 22, 2021    To Whom It May Concern:         This is confirmation that Gulshan Lam attended his scheduled appointment with DINORA Granados on 10/22/21.         If you have any questions please do not hesitate to call me at the phone number listed below.    Sincerely,          Daisy Woods A.P.R.N.  [phone]

## 2021-10-22 NOTE — PROGRESS NOTES
Subjective     Gulshan Lam is a 46 y.o. male who presents with GI Problem (stomach pain - )            Gulshan comes in today with a flare of his chronic stomach pain with chronic nausea and vomiting.  He rates generalized abdominal pain 10/10, worst at epigastric region. He reports heartburn.  He has been having a pain flare for 2 days. He has had similar symptoms since childhood. He denies any fever, chills, myalgias, chest pain or shortness of breath.  He notes mild bilateral flank pain with no dysuria or hematuria.  He denies any diarrhea, bloody or tarry stools.  He reports that in the past he has tried GI cocktail, phenergan, zofran and heartburn medications with no relief.  He reports that insulin and blood pressure medication improves his symptoms historically.  He reports his blood sugars average 130.  He reports that he had upper GI and colonoscopy in the past with no explanation for his symptoms.  He smokes pot occasionally.  He has been evaluated for possible hyperemesis and GI pain secondary to marijuana use, but reports the he discontinued marijuana for 1 year and his symptoms persisted without change.  He is scheduled to see his PCP later today for same concern.  He is not currently established with GI.      Review of Systems   Constitutional: Negative for chills, fever and malaise/fatigue.   Respiratory: Negative for cough and shortness of breath.    Cardiovascular: Negative for chest pain and palpitations.   Gastrointestinal: Positive for abdominal pain, heartburn, nausea and vomiting. Negative for blood in stool, constipation, diarrhea and melena.   Genitourinary: Positive for flank pain. Negative for dysuria, frequency, hematuria and urgency.     Medications, Allergies, and current problem list reviewed today in Epic         Objective     Blood Pressure 136/72   Pulse 86   Temperature 37.1 °C (98.8 °F) (Temporal)   Respiration 16   Height 1.829 m (6')   Weight 85.3 kg (188 lb)    Oxygen Saturation 98%   Body Mass Index 25.50 kg/m²      Physical Exam  Vitals reviewed.   Constitutional:       Appearance: Normal appearance. He is well-developed. He is not ill-appearing, toxic-appearing or diaphoretic.      Comments: Gulshan is gripping his stomach, rocking back and forth in pain, and wincing.     Eyes:      General: No scleral icterus.        Right eye: No discharge.         Left eye: No discharge.      Conjunctiva/sclera: Conjunctivae normal.   Neck:      Thyroid: No thyromegaly.      Vascular: No JVD.      Trachea: No tracheal deviation.   Cardiovascular:      Rate and Rhythm: Normal rate and regular rhythm.      Heart sounds: Normal heart sounds. No murmur heard.   No friction rub. No gallop.    Pulmonary:      Effort: Pulmonary effort is normal. No respiratory distress.      Breath sounds: Normal breath sounds. No stridor. No wheezing, rhonchi or rales.   Chest:      Chest wall: No tenderness.   Abdominal:      General: Abdomen is flat. Bowel sounds are normal.      Palpations: Abdomen is soft.      Tenderness: There is generalized abdominal tenderness. There is right CVA tenderness and left CVA tenderness. There is no guarding. Negative signs include Calero's sign and McBurney's sign.   Musculoskeletal:      Cervical back: Neck supple.   Lymphadenopathy:      Cervical: No cervical adenopathy.   Skin:     General: Skin is warm and dry.      Coloration: Skin is not pale.      Findings: No erythema or rash.   Neurological:      Mental Status: He is alert and oriented to person, place, and time.   Psychiatric:         Mood and Affect: Mood normal.                             Assessment & Plan        1. Chronic abdominal pain     2. Gastroesophageal reflux disease, unspecified whether esophagitis present     3. Non-intractable vomiting with nausea, unspecified vomiting type     4. Bilateral flank pain       Discussed exam findings with Gulshan.  Differential reviewed.  Etiology unclear.   "Discussed available diagnostics and treatment available in the urgent care.  He reports that \"none of that ever works\".  He declines urinalysis, GI cocktail, zofran and phenergan.  He is distressed.  Recommended further evaluation and care in the ED for acute abdominal symptoms.  He verbalized understanding of and agreed with plan of care.  He will travel via private car to [facility] ED.                 "

## 2021-10-23 NOTE — ASSESSMENT & PLAN NOTE
"Currently not taking his prescribed Lisinopril.   Reports diet is \"okay\".   He is not following a low-salt diet.  He is not exercising regularly.  He is not taking baby aspirin daily.   He is not monitoring BP at home.   Denies symptoms low BP: light-headed, tunnel-vision, unusual fatigue, dizziness.  Denies symptoms high BP: pounding headache, visual changes, palpitations, flushed face.   Denies chest pain, shortness of breath, dyspnea on exertion.     "

## 2021-10-23 NOTE — PROGRESS NOTES
"Subjective  Chief Complaint  Lab Results    History of Present Illness  Gulshan Lam is a 46 y.o. male. This established patient is here today to go over lab results.    Primary hypertension  Currently not taking his prescribed Lisinopril.   Reports diet is \"okay\".   He is not following a low-salt diet.  He is not exercising regularly.  He is not taking baby aspirin daily.   He is not monitoring BP at home.   Denies symptoms low BP: light-headed, tunnel-vision, unusual fatigue, dizziness.  Denies symptoms high BP: pounding headache, visual changes, palpitations, flushed face.   Denies chest pain, shortness of breath, dyspnea on exertion.       Type 2 diabetes mellitus, with long-term current use of insulin (HCC)  Chronic, controlled.  He indicates that he is feeling well and denies any symptoms referable to this diagnoses. Specifically denies chest pain, palpitations, dyspnea, orthopnea, PND or peripheral edema, polyuria, polydipsia, urinary complaints, abdominal complaints, myalgias, numbness, weakness or other related symptoms.   Medications: Tresiba and Metformin  mg BID.  Glucose at home: Unknown.  Hypoglycemia events: Denies.  Statin: None.  ACEI/ARB: Lisinopril but currently not taking.  ASA: No.  Exercise: None.  Last eye exam: Unknown. Denies visual blurring, double vision, eye pain and floaters.  Last monofilament foot exam: Unknown. Denies foot pain, numbness, calluses, ulcers.  Diabetic complications: none  A1c:   Lab Results   Component Value Date/Time    HBA1C 5.5 10/11/2021 08:06 AM         Past Medical History    Allergies: Patient has no known allergies.  History reviewed. No pertinent past medical history.  History reviewed. No pertinent surgical history.  Current Outpatient Medications Ordered in Epic   Medication Sig Dispense Refill   • lisinopril (PRINIVIL) 20 MG Tab Take 1 Tablet by mouth every day. 90 Tablet 1   • metFORMIN ER (GLUCOPHAGE XR) 500 MG TABLET SR 24 HR Take 500 mg by " "mouth 2 times a day. Indications: Type 2 Diabetes       No current Epic-ordered facility-administered medications on file.     Family History:    Family History   Problem Relation Age of Onset   • Hypertension Mother    • Diabetes Father    • Hypertension Father    • Lung Disease Neg Hx    • Cancer Neg Hx       Personal/Social History:    Social History     Tobacco Use   • Smoking status: Current Every Day Smoker   • Smokeless tobacco: Current User     Types: Chew   • Tobacco comment: states that he quit 2 days ago.   Vaping Use   • Vaping Use: Never used   Substance Use Topics   • Alcohol use: Not Currently   • Drug use: Not Currently     Social History     Social History Narrative   • Not on file      Review of Systems:   General: Negative for fever/chills and unexpected weight change.   Eyes:  Negative for vision changes, eye pain.  Respiratory:  Negative for cough and dyspnea.    Cardiovascular:  Negative for chest pain and palpitations.  Genitourinary:  Negative for dysuria and hematuria.   Musculoskeletal:  Negative for myalgias.   Skin:  Negative for rash.   Neurological:  Negative for numbness/tingling and headaches.     Objective  Physical Exam:   /76 (BP Location: Right arm, Patient Position: Sitting, BP Cuff Size: Adult)   Pulse 100   Temp 36.2 °C (97.1 °F) (Temporal)   Resp 20   Ht 1.854 m (6' 1\")   Wt 88.5 kg (195 lb)   SpO2 97%  Body mass index is 25.73 kg/m².  General:  Alert and oriented.  Well appearing.  NAD  Neck: Supple without JVD. No lymphadenopathy.  Pulmonary:  Normal effort.  Clear to ausculation without rales, ronchi, or wheezing.  Cardiovascular:  Regular rate and rhythm without murmur, rubs or gallop.   Skin:  Warm and dry.  No obvious lesions.  Musculoskeletal:  No extremity cyanosis, clubbing, or edema.      Diagnostic Testing/Findings:  Labs:     10/11/2021 08:06   Sodium 141   Potassium 3.9   Chloride 104   Co2 26   Anion Gap 11.0   Glucose 119 (H)   Bun 8   Creatinine " 0.69   GFR If  >60   GFR If Non African American >60   Calcium 9.1   AST(SGOT) 14   ALT(SGPT) 12   Alkaline Phosphatase 67   Total Bilirubin 0.4   Albumin 4.6   Total Protein 7.3   Globulin 2.7   A-G Ratio 1.7   Glycohemoglobin 5.5   Estim. Avg Glu 111   Fasting Status Fasting   Cholesterol,Tot 118   Triglycerides 63   HDL 46   LDL 59       Assessment/Plan  1. Type 2 diabetes mellitus without complication, with long-term current use of insulin (HCC)  Chronic and controlled.  Discussed with him that his most recent A1C was 5.5.  At this point in time I would like him to stop taking Tresiba insulin, he verbalized understanding.  Educated to continue take Metformin  mg twice a day.  Educated on healthy diet and exercise.  Discussed with him that I am still waiting medical records to see what his original presentation to [facility] was that started this current diagnosis. He appears at times to be a poor historian and without other medical records to confirm diagnosis of medication at this time, currently being cautious with treatment.    2. Primary hypertension  Chronic and ongoing.  He states that he ran out of his medication and forgot to ask for refill at his previous appointment.  Continue lisinopril 20 mg daily.  Educated on healthy diet and exercise.  - lisinopril (PRINIVIL) 20 MG Tab; Take 1 Tablet by mouth every day.  Dispense: 90 Tablet; Refill: 1        Return in about 1 month (around 11/22/2021) for Medication F/U.    Please note that this dictation was created using voice recognition software. I have made every reasonable attempt to correct obvious errors, but I expect that there are errors of grammar and possibly content that I did not discover before finalizing the note.    ERA Bledsoe  [facility]

## 2021-10-23 NOTE — ASSESSMENT & PLAN NOTE
Chronic, controlled.  He indicates that he is feeling well and denies any symptoms referable to this diagnoses. Specifically denies chest pain, palpitations, dyspnea, orthopnea, PND or peripheral edema, polyuria, polydipsia, urinary complaints, abdominal complaints, myalgias, numbness, weakness or other related symptoms.   Medications: Tresiba and Metformin  mg BID.  Glucose at home: Unknown.  Hypoglycemia events: Denies.  Statin: None.  ACEI/ARB: Lisinopril but currently not taking.  ASA: No.  Exercise: None.  Last eye exam: Unknown. Denies visual blurring, double vision, eye pain and floaters.  Last monofilament foot exam: Unknown. Denies foot pain, numbness, calluses, ulcers.  Diabetic complications: none  A1c:   Lab Results   Component Value Date/Time    HBA1C 5.5 10/11/2021 08:06 AM

## 2021-11-23 ENCOUNTER — TELEPHONE (OUTPATIENT)
Dept: ENDOCRINOLOGY | Facility: MEDICAL CENTER | Age: 46
End: 2021-11-23

## 2021-11-23 NOTE — TELEPHONE ENCOUNTER
LVM notifying PT we had to cancel their 02/28 appt as it was scheduled on an US day. Asked that they call us back asap so we can reschedule

## 2021-12-07 ENCOUNTER — HOSPITAL ENCOUNTER (EMERGENCY)
Facility: MEDICAL CENTER | Age: 46
End: 2021-12-07
Payer: COMMERCIAL

## 2021-12-07 VITALS
HEART RATE: 89 BPM | WEIGHT: 197.09 LBS | OXYGEN SATURATION: 98 % | SYSTOLIC BLOOD PRESSURE: 167 MMHG | DIASTOLIC BLOOD PRESSURE: 97 MMHG | HEIGHT: 73 IN | TEMPERATURE: 97.4 F | BODY MASS INDEX: 26.12 KG/M2 | RESPIRATION RATE: 16 BRPM

## 2021-12-07 PROCEDURE — 302449 STATCHG TRIAGE ONLY (STATISTIC)

## 2021-12-07 NOTE — ED TRIAGE NOTES
".  Chief Complaint   Patient presents with   • N/V     Onset X approximately one year.    • Abdominal Pain     Diffuse.  Intermittent, \" sharp pain. \"   Pt has had EGD/colonoscopy earlier this year.  Pt states \" feel a little better than when it first started, still throwing up. \"  No fever.  + chills.  Last emesis PTA, no blood.  Pt has not received COVID vaccine.   "

## 2021-12-16 ENCOUNTER — OFFICE VISIT (OUTPATIENT)
Dept: URGENT CARE | Facility: CLINIC | Age: 46
End: 2021-12-16

## 2021-12-16 VITALS
WEIGHT: 198.41 LBS | RESPIRATION RATE: 18 BRPM | HEIGHT: 73 IN | HEART RATE: 88 BPM | OXYGEN SATURATION: 97 % | DIASTOLIC BLOOD PRESSURE: 100 MMHG | BODY MASS INDEX: 26.3 KG/M2 | TEMPERATURE: 97.6 F | SYSTOLIC BLOOD PRESSURE: 130 MMHG

## 2021-12-16 DIAGNOSIS — R11.2 NON-INTRACTABLE VOMITING WITH NAUSEA, UNSPECIFIED VOMITING TYPE: ICD-10-CM

## 2021-12-16 DIAGNOSIS — R10.84 ABDOMINAL PAIN, GENERALIZED: ICD-10-CM

## 2021-12-16 PROCEDURE — 99214 OFFICE O/P EST MOD 30 MIN: CPT | Performed by: NURSE PRACTITIONER

## 2021-12-16 ASSESSMENT — ENCOUNTER SYMPTOMS
BLOOD IN STOOL: 0
SHORTNESS OF BREATH: 0
PALPITATIONS: 0
ORTHOPNEA: 0
COUGH: 0
DIAPHORESIS: 1
VOMITING: 1
ABDOMINAL PAIN: 1
HEMOPTYSIS: 0
CHILLS: 0
NAUSEA: 1
DIARRHEA: 0
DIZZINESS: 0
CONSTIPATION: 0
FEVER: 0
WHEEZING: 0
SPUTUM PRODUCTION: 0

## 2021-12-16 NOTE — LETTER
December 16, 2021         Patient: Gulshan Lam   YOB: 1975   Date of Visit: 12/16/2021           To Whom it May Concern:    Gulshan Lam was seen in my clinic on 12/16/2021. Please excuse his from work for up to 3 days; he may return to work when symptoms improve.    If you have any questions or concerns, please don't hesitate to call.        Sincerely,           FLORENCIA Damon.  Electronically Signed

## 2021-12-16 NOTE — PROGRESS NOTES
Subjective     Gulshan Lam is a 46 y.o. male who presents with Emesis (x 1 year 2 times today) and Abdominal Pain (pain is a 6 or 7 today)            Gulshan comes in today with a flare of his chronic stomach pain with chronic nausea and vomiting for the past 10 days.  He rates generalized abdominal pain 7/10, worst at epigastric region.  Yesterday when he vomited he noted small amount of bark blood.  NO bright red blood or coffee ground appearing vomitus. Vomited 2 times today with no overt blood.  No history of vomiting blood.  He has had similar intermittent chronic GI pain with nausea and vomiting symptoms since childhood. He denies any fever, chills, myalgias, chest pain or shortness of breath.  He denies any heartburn, diarrhea, bloody or tarry stools or dysuria.  He reports that in the past he has tried GI cocktail, phenergan, zofran and heartburn medications historically with no relief.   He reports that he had upper GI in June 2021 with no explanation for his symptoms.  He smokes pot daily.  He has been evaluated for possible hyperemesis and GI pain secondary to marijuana use, but reports the he discontinued marijuana for 1 year and his symptoms persisted without change.  He presented to the ED on 12/7/21 with these symptoms but left without being seen after a 4 hour wait.        Review of Systems   Constitutional: Positive for diaphoresis and malaise/fatigue. Negative for chills and fever.   Respiratory: Negative for cough, hemoptysis, sputum production, shortness of breath and wheezing.    Cardiovascular: Negative for chest pain, palpitations, orthopnea and leg swelling.   Gastrointestinal: Positive for abdominal pain, nausea and vomiting. Negative for blood in stool, constipation, diarrhea and melena.   Genitourinary: Negative for dysuria.   Neurological: Negative for dizziness.     Medications, Allergies, and current problem list reviewed today in Epic         Objective     Blood Pressure  "130/100   Pulse 88   Temperature 36.4 °C (97.6 °F) (Temporal)   Respiration 18   Height 1.854 m (6' 1\")   Weight 90 kg (198 lb 6.6 oz)   Oxygen Saturation 97%   Body Mass Index 26.18 kg/m²      Physical Exam  Vitals reviewed.   Constitutional:       Appearance: Normal appearance. He is well-developed. He is not toxic-appearing or diaphoretic.      Comments: Gulshan appears to be in acute pain.  He clutches his abdomen and lays down in the waiting room across the couch to try and relieve his discomfort.     HENT:      Mouth/Throat:      Mouth: Mucous membranes are moist.      Comments: Lips pink; phonation normal.  Eyes:      General: No scleral icterus.        Right eye: No discharge.         Left eye: No discharge.      Conjunctiva/sclera: Conjunctivae normal.      Pupils: Pupils are equal, round, and reactive to light.   Neck:      Thyroid: No thyromegaly.      Vascular: No JVD.      Trachea: No tracheal deviation.   Cardiovascular:      Rate and Rhythm: Normal rate and regular rhythm.      Heart sounds: Normal heart sounds. No murmur heard.  No friction rub. No gallop.    Pulmonary:      Effort: Pulmonary effort is normal. No respiratory distress.      Breath sounds: Normal breath sounds. No stridor. No wheezing or rales.   Chest:      Chest wall: No tenderness.   Abdominal:      General: Abdomen is flat. Bowel sounds are normal. There is no distension.      Palpations: Abdomen is soft. There is no shifting dullness, fluid wave, hepatomegaly, splenomegaly, mass or pulsatile mass.      Tenderness: There is generalized abdominal tenderness. There is no right CVA tenderness, left CVA tenderness, guarding or rebound. Negative signs include Calero's sign and McBurney's sign.   Musculoskeletal:      Cervical back: Neck supple.   Lymphadenopathy:      Cervical: No cervical adenopathy.   Skin:     General: Skin is warm and dry.      Coloration: Skin is not jaundiced or pale.      Findings: No erythema or rash. "   Neurological:      Mental Status: He is alert and oriented to person, place, and time.   Psychiatric:         Mood and Affect: Mood normal.                             Assessment & Plan        1. Abdominal pain, generalized     2. Non-intractable vomiting with nausea, unspecified vomiting type       Discussed exam findings with Gulshan.  Etiology unclear.  Viral cause unlikely due to chronic nature of symptoms.  Differential reviewed.    Recommended further evaluation and care in the ED where there is higher level diagnostics and care that is not available at [place] urgent care site.  He verbalized understanding of and agrees with plan of care.  He will travel via private car to [facility] ED.

## 2021-12-17 ENCOUNTER — OFFICE VISIT (OUTPATIENT)
Dept: URGENT CARE | Facility: PHYSICIAN GROUP | Age: 46
End: 2021-12-17
Payer: COMMERCIAL

## 2021-12-17 VITALS
RESPIRATION RATE: 16 BRPM | HEART RATE: 99 BPM | HEIGHT: 73 IN | TEMPERATURE: 97.2 F | BODY MASS INDEX: 26.24 KG/M2 | OXYGEN SATURATION: 98 % | SYSTOLIC BLOOD PRESSURE: 128 MMHG | DIASTOLIC BLOOD PRESSURE: 62 MMHG | WEIGHT: 198 LBS

## 2021-12-17 DIAGNOSIS — E11.9 TYPE 2 DIABETES MELLITUS WITHOUT COMPLICATION, WITH LONG-TERM CURRENT USE OF INSULIN (HCC): ICD-10-CM

## 2021-12-17 DIAGNOSIS — Z79.4 TYPE 2 DIABETES MELLITUS WITHOUT COMPLICATION, WITH LONG-TERM CURRENT USE OF INSULIN (HCC): ICD-10-CM

## 2021-12-17 DIAGNOSIS — R11.2 NAUSEA AND VOMITING, INTRACTABILITY OF VOMITING NOT SPECIFIED, UNSPECIFIED VOMITING TYPE: ICD-10-CM

## 2021-12-17 LAB — GLUCOSE BLD-MCNC: 126 MG/DL (ref 70–100)

## 2021-12-17 PROCEDURE — 99213 OFFICE O/P EST LOW 20 MIN: CPT | Performed by: PHYSICIAN ASSISTANT

## 2021-12-17 PROCEDURE — 82962 GLUCOSE BLOOD TEST: CPT | Performed by: PHYSICIAN ASSISTANT

## 2021-12-17 ASSESSMENT — ENCOUNTER SYMPTOMS
ABDOMINAL PAIN: 1
CONSTIPATION: 0
EYE PAIN: 0
FEVER: 0
BLOOD IN STOOL: 0
DIARRHEA: 0
HEARTBURN: 1
VOMITING: 1
SHORTNESS OF BREATH: 0
NAUSEA: 1
MYALGIAS: 0
SORE THROAT: 0
COUGH: 0
HEADACHES: 0
CHILLS: 0

## 2021-12-18 NOTE — PROGRESS NOTES
Subjective:   Gulshan Lam is a 46 y.o. male who presents for GI Problem (gas, yellow bowl; wax and wanes onset 1x year) and Emesis (5 epi/ last 24 hrs)      Is a 46-year-old male with a history of gastrointestinal issues for around 1 year, has had a 2 to 3-week history of waxing and waning epigastric abdominal pain with nausea, bloating and occasional vomiting.  He has had 5 episodes of emesis in the last 24 hours.  Denies any bloody emesis.  He has had an upper endoscopy and lower endoscopy and imaging done through gastroenterology which did not reveal any specific etiology of his symptoms.  He has a referral to endocrinology as he hopes this may be the etiology of symptoms.  He has considered hyperemesis cannabinoid syndrome, but not had any improvement after discontinuing marijuana use.  Patient was formerly on insulin as well as Metformin, stopped his insulin around 1 month ago and does not have the ability to check his blood sugar at home.    Review of records:  Yesterday patient was seen at a different urgent care location and based on his symptomatology was encouraged to present to the ER for concerning symptoms of abdominal pain.  When questioned about this the patient said that he did not want to drive to the ER      Review of Systems   Constitutional: Positive for malaise/fatigue. Negative for chills and fever.   HENT: Negative for congestion, ear pain and sore throat.    Eyes: Negative for pain.   Respiratory: Negative for cough and shortness of breath.    Cardiovascular: Negative for chest pain.   Gastrointestinal: Positive for abdominal pain, heartburn, nausea and vomiting. Negative for blood in stool, constipation, diarrhea and melena.   Genitourinary: Negative for dysuria.   Musculoskeletal: Negative for myalgias.   Skin: Negative for rash.   Neurological: Negative for headaches.       Medications, Allergies, and current problem list reviewed today in Epic.     Objective:     /62 (BP  "Location: Right arm, Patient Position: Sitting, BP Cuff Size: Adult)   Pulse 99   Temp 36.2 °C (97.2 °F) (Temporal)   Resp 16   Ht 1.854 m (6' 1\")   Wt 89.8 kg (198 lb)   SpO2 98%     Physical Exam  Vitals reviewed.   Constitutional:       Appearance: Normal appearance.   HENT:      Head: Normocephalic and atraumatic.      Right Ear: External ear normal.      Left Ear: External ear normal.      Nose: Nose normal.      Mouth/Throat:      Mouth: Mucous membranes are moist.   Eyes:      Conjunctiva/sclera: Conjunctivae normal.   Cardiovascular:      Rate and Rhythm: Normal rate and regular rhythm.   Pulmonary:      Effort: Pulmonary effort is normal.   Abdominal:      Comments: Mild epigastric abdominal tenderness without rebound or guarding.  Negative Calero's.  Negative McBurney's.  No distention or mass felt.   Skin:     General: Skin is warm and dry.      Capillary Refill: Capillary refill takes less than 2 seconds.   Neurological:      Mental Status: He is alert and oriented to person, place, and time.         Lab Results/POC Test Results   Results for orders placed or performed in visit on 12/17/21   POCT glucose   Result Value Ref Range    Glucose - Accu-Ck 126 (A) 70 - 100 mg/dL           Assessment/Plan:     Diagnosis and associated orders:     1. Nausea and vomiting, intractability of vomiting not specified, unspecified vomiting type  POCT glucose    CANCELED: POCT  A1C   2. Type 2 diabetes mellitus without complication, with long-term current use of insulin ([facility])  POCT glucose    CANCELED: POCT  A1C      Comments/MDM:     • Patient with concerning symptoms of ongoing abdominal pain and nausea and vomiting.  I agree with the provider yesterday and strongly recommended ER evaluation.  Patient has already had an extensive work-up however he seems to have new and evolving symptoms that warrant higher level imaging.  Due to his presentation late in the day we cannot provide imaging this evening so I " encouraged him to present immediately to the ER.  I discussed with him a broad differential, and that without the appropriate lab work and imaging we cannot make a diagnosis or rule out anything life sensitive.  He appeared convinced and reported he would present to the ER         Differential diagnosis, natural history, supportive care, and indications for immediate follow-up discussed.    Advised the patient to follow-up with the primary care physician for recheck, reevaluation, and consideration of further management.    Please note that this dictation was created using voice recognition software. I have made a reasonable attempt to correct obvious errors, but I expect that there are errors of grammar and possibly content that I did not discover before finalizing the note.    This note was electronically signed by Tahir Rodriguez PA-C

## 2021-12-20 ENCOUNTER — APPOINTMENT (OUTPATIENT)
Dept: RADIOLOGY | Facility: MEDICAL CENTER | Age: 46
End: 2021-12-20
Attending: EMERGENCY MEDICINE
Payer: COMMERCIAL

## 2021-12-20 ENCOUNTER — HOSPITAL ENCOUNTER (EMERGENCY)
Facility: MEDICAL CENTER | Age: 46
End: 2021-12-20
Attending: EMERGENCY MEDICINE
Payer: COMMERCIAL

## 2021-12-20 VITALS
RESPIRATION RATE: 18 BRPM | BODY MASS INDEX: 26.21 KG/M2 | TEMPERATURE: 97.7 F | OXYGEN SATURATION: 97 % | SYSTOLIC BLOOD PRESSURE: 137 MMHG | DIASTOLIC BLOOD PRESSURE: 92 MMHG | WEIGHT: 197.75 LBS | HEIGHT: 73 IN | HEART RATE: 83 BPM

## 2021-12-20 DIAGNOSIS — K29.00 ACUTE SUPERFICIAL GASTRITIS WITHOUT HEMORRHAGE: ICD-10-CM

## 2021-12-20 LAB
ALBUMIN SERPL BCP-MCNC: 4.3 G/DL (ref 3.2–4.9)
ALBUMIN/GLOB SERPL: 1.6 G/DL
ALP SERPL-CCNC: 64 U/L (ref 30–99)
ALT SERPL-CCNC: 10 U/L (ref 2–50)
ANION GAP SERPL CALC-SCNC: 12 MMOL/L (ref 7–16)
APPEARANCE UR: CLEAR
AST SERPL-CCNC: 18 U/L (ref 12–45)
BASOPHILS # BLD AUTO: 0.1 % (ref 0–1.8)
BASOPHILS # BLD: 0.01 K/UL (ref 0–0.12)
BILIRUB SERPL-MCNC: 0.7 MG/DL (ref 0.1–1.5)
BILIRUB UR QL STRIP.AUTO: NEGATIVE
BUN SERPL-MCNC: 8 MG/DL (ref 8–22)
CALCIUM SERPL-MCNC: 9.1 MG/DL (ref 8.4–10.2)
CHLORIDE SERPL-SCNC: 103 MMOL/L (ref 96–112)
CO2 SERPL-SCNC: 21 MMOL/L (ref 20–33)
COLOR UR: YELLOW
CREAT SERPL-MCNC: 0.62 MG/DL (ref 0.5–1.4)
EOSINOPHIL # BLD AUTO: 0.04 K/UL (ref 0–0.51)
EOSINOPHIL NFR BLD: 0.5 % (ref 0–6.9)
ERYTHROCYTE [DISTWIDTH] IN BLOOD BY AUTOMATED COUNT: 40.8 FL (ref 35.9–50)
GLOBULIN SER CALC-MCNC: 2.7 G/DL (ref 1.9–3.5)
GLUCOSE SERPL-MCNC: 111 MG/DL (ref 65–99)
GLUCOSE UR STRIP.AUTO-MCNC: NEGATIVE MG/DL
HCT VFR BLD AUTO: 47.7 % (ref 42–52)
HGB BLD-MCNC: 16.3 G/DL (ref 14–18)
IMM GRANULOCYTES # BLD AUTO: 0.03 K/UL (ref 0–0.11)
IMM GRANULOCYTES NFR BLD AUTO: 0.4 % (ref 0–0.9)
KETONES UR STRIP.AUTO-MCNC: NEGATIVE MG/DL
LEUKOCYTE ESTERASE UR QL STRIP.AUTO: NEGATIVE
LIPASE SERPL-CCNC: 16 U/L (ref 7–58)
LYMPHOCYTES # BLD AUTO: 1.86 K/UL (ref 1–4.8)
LYMPHOCYTES NFR BLD: 23 % (ref 22–41)
MCH RBC QN AUTO: 30 PG (ref 27–33)
MCHC RBC AUTO-ENTMCNC: 34.2 G/DL (ref 33.7–35.3)
MCV RBC AUTO: 87.8 FL (ref 81.4–97.8)
MICRO URNS: NORMAL
MONOCYTES # BLD AUTO: 0.47 K/UL (ref 0–0.85)
MONOCYTES NFR BLD AUTO: 5.8 % (ref 0–13.4)
NEUTROPHILS # BLD AUTO: 5.68 K/UL (ref 1.82–7.42)
NEUTROPHILS NFR BLD: 70.2 % (ref 44–72)
NITRITE UR QL STRIP.AUTO: NEGATIVE
NRBC # BLD AUTO: 0 K/UL
NRBC BLD-RTO: 0 /100 WBC
PH UR STRIP.AUTO: 7.5 [PH] (ref 5–8)
PLATELET # BLD AUTO: 212 K/UL (ref 164–446)
PMV BLD AUTO: 10 FL (ref 9–12.9)
POTASSIUM SERPL-SCNC: 4.1 MMOL/L (ref 3.6–5.5)
PROT SERPL-MCNC: 7 G/DL (ref 6–8.2)
PROT UR QL STRIP: NEGATIVE MG/DL
RBC # BLD AUTO: 5.43 M/UL (ref 4.7–6.1)
RBC UR QL AUTO: NEGATIVE
SODIUM SERPL-SCNC: 136 MMOL/L (ref 135–145)
SP GR UR STRIP.AUTO: 1.02
WBC # BLD AUTO: 8.1 K/UL (ref 4.8–10.8)

## 2021-12-20 PROCEDURE — 85025 COMPLETE CBC W/AUTO DIFF WBC: CPT

## 2021-12-20 PROCEDURE — 80053 COMPREHEN METABOLIC PANEL: CPT

## 2021-12-20 PROCEDURE — 81003 URINALYSIS AUTO W/O SCOPE: CPT

## 2021-12-20 PROCEDURE — 36415 COLL VENOUS BLD VENIPUNCTURE: CPT

## 2021-12-20 PROCEDURE — 99284 EMERGENCY DEPT VISIT MOD MDM: CPT

## 2021-12-20 PROCEDURE — 83690 ASSAY OF LIPASE: CPT

## 2021-12-20 PROCEDURE — 76705 ECHO EXAM OF ABDOMEN: CPT

## 2021-12-20 RX ORDER — INSULIN DEGLUDEC 100 U/ML
2 INJECTION, SOLUTION SUBCUTANEOUS PRN
COMMUNITY

## 2021-12-20 RX ORDER — SUCRALFATE 1 G/1
1 TABLET ORAL
Qty: 60 TABLET | Refills: 3 | Status: SHIPPED | OUTPATIENT
Start: 2021-12-20

## 2021-12-20 NOTE — ED PROVIDER NOTES
ED Provider Note    CHIEF COMPLAINT  Chief Complaint   Patient presents with   • Abdominal Pain     x 1 year, has followed w/ GI   • N/V     x 1 year, states has followup w/ Endocrinologist next month       HPI  Gulshan Lam is a 46 y.o. male who presents evaluation of epigastric discomfort nausea vomiting not feeling well. The patient has a history of type 2 diabetes. He has been seen by gastroenterology most recently this last summer around 6 months ago. He underwent both upper and lower endoscopy which was reportedly normal. He does report some black stools but no hematochezia or hematemesis or coffee-ground emesis. He takes Gas-X but does not take any antiislet agents    REVIEW OF SYSTEMS  See HPI for further details. No high fevers chills night sweats weight loss numbness tingling weakness rash all other systems are negative.     PAST MEDICAL HISTORY  Past Medical History:   Diagnosis Date   • Cannabinoid hyperemesis syndrome    • Diabetes (HCC)    • GERD (gastroesophageal reflux disease)    • Hypertension    • Intractable nausea and vomiting        FAMILY HISTORY  Noncontributory    SOCIAL HISTORY  Social History     Socioeconomic History   • Marital status: Single     Spouse name: Not on file   • Number of children: Not on file   • Years of education: Not on file   • Highest education level: Not on file   Occupational History   • Not on file   Tobacco Use   • Smoking status: Current Every Day Smoker     Packs/day: 1.00   • Smokeless tobacco: Current User     Types: Chew   Vaping Use   • Vaping Use: Never used   Substance and Sexual Activity   • Alcohol use: Not Currently   • Drug use: Yes     Frequency: 14.0 times per week     Types: Marijuana     Comment: Marijuana    • Sexual activity: Yes     Partners: Female   Other Topics Concern   • Not on file   Social History Narrative   • Not on file     Social Determinants of Health     Financial Resource Strain:    • Difficulty of Paying Living Expenses: Not  "on file   Food Insecurity:    • Worried About Running Out of Food in the Last Year: Not on file   • Ran Out of Food in the Last Year: Not on file   Transportation Needs:    • Lack of Transportation (Medical): Not on file   • Lack of Transportation (Non-Medical): Not on file   Physical Activity:    • Days of Exercise per Week: Not on file   • Minutes of Exercise per Session: Not on file   Stress:    • Feeling of Stress : Not on file   Social Connections:    • Frequency of Communication with Friends and Family: Not on file   • Frequency of Social Gatherings with Friends and Family: Not on file   • Attends Hoahaoism Services: Not on file   • Active Member of Clubs or Organizations: Not on file   • Attends Club or Organization Meetings: Not on file   • Marital Status: Not on file   Intimate Partner Violence:    • Fear of Current or Ex-Partner: Not on file   • Emotionally Abused: Not on file   • Physically Abused: Not on file   • Sexually Abused: Not on file   Housing Stability:    • Unable to Pay for Housing in the Last Year: Not on file   • Number of Places Lived in the Last Year: Not on file   • Unstable Housing in the Last Year: Not on file     Positive for tobacco no alcohol Daily marijuana  SURGICAL HISTORY  No past surgical history on file.  No thoracoabdominal surgeries  CURRENT MEDICATIONS  No current facility-administered medications for this encounter.    Current Outpatient Medications:   •  Insulin Degludec (TRESIBA FLEXTOUCH) 100 UNIT/ML Solution Pen-injector, Inject 2 Units under the skin as needed (Per pt when he feels he needs this medication)., Disp: , Rfl:       ALLERGIES  No Known Allergies    PHYSICAL EXAM  VITAL SIGNS: /88   Pulse 78   Temp 36.8 °C (98.3 °F) (Temporal)   Resp 15   Ht 1.854 m (6' 1\")   Wt 89.7 kg (197 lb 12 oz)   SpO2 98%   BMI 26.09 kg/m²       Constitutional: Well developed, Well nourished, No acute distress, Non-toxic appearance.   HENT: Normocephalic, Atraumatic, " Bilateral external ears normal, Oropharynx moist, No oral exudates, Nose normal.   Eyes: PERRLA, EOMI, Conjunctiva normal, No discharge.   Neck: Normal range of motion, No tenderness, Supple, No stridor.   Cardiovascular: Normal heart rate, Normal rhythm, No murmurs, No rubs, No gallops.   Thorax & Lungs: Normal breath sounds, No respiratory distress, No wheezing, No chest tenderness.   Abdomen: Bowel sounds normal, Soft, No tenderness, No masses, No pulsatile masses.   Skin: Warm, Dry, No erythema, No rash.   Extremities: Intact distal pulses, No edema, No tenderness, No cyanosis, No clubbing.   Neurologic: Alert & oriented x 3, Normal motor function, Normal sensory function, No focal deficits noted.   Psychiatric: Affect normal, Judgment normal, Mood normal.     US-RUQ   Final Result      1.  No evidence of gallstone or evidence of biliary ductal dilatation.      2.  18 x 13 x 8 mm echogenic solid mass within the right lobe the liver most likely representing benign hemangioma.        Results for orders placed or performed during the hospital encounter of 12/20/21   URINALYSIS    Specimen: Urine   Result Value Ref Range    Color Yellow     Character Clear     Specific Gravity 1.020 <1.035    Ph 7.5 5.0 - 8.0    Glucose Negative Negative mg/dL    Ketones Negative Negative mg/dL    Protein Negative Negative mg/dL    Bilirubin Negative Negative    Nitrite Negative Negative    Leukocyte Esterase Negative Negative    Occult Blood Negative Negative    Micro Urine Req see below    CBC WITH DIFFERENTIAL   Result Value Ref Range    WBC 8.1 4.8 - 10.8 K/uL    RBC 5.43 4.70 - 6.10 M/uL    Hemoglobin 16.3 14.0 - 18.0 g/dL    Hematocrit 47.7 42.0 - 52.0 %    MCV 87.8 81.4 - 97.8 fL    MCH 30.0 27.0 - 33.0 pg    MCHC 34.2 33.7 - 35.3 g/dL    RDW 40.8 35.9 - 50.0 fL    Platelet Count 212 164 - 446 K/uL    MPV 10.0 9.0 - 12.9 fL    Neutrophils-Polys 70.20 44.00 - 72.00 %    Lymphocytes 23.00 22.00 - 41.00 %    Monocytes 5.80 0.00  - 13.40 %    Eosinophils 0.50 0.00 - 6.90 %    Basophils 0.10 0.00 - 1.80 %    Immature Granulocytes 0.40 0.00 - 0.90 %    Nucleated RBC 0.00 /100 WBC    Neutrophils (Absolute) 5.68 1.82 - 7.42 K/uL    Lymphs (Absolute) 1.86 1.00 - 4.80 K/uL    Monos (Absolute) 0.47 0.00 - 0.85 K/uL    Eos (Absolute) 0.04 0.00 - 0.51 K/uL    Baso (Absolute) 0.01 0.00 - 0.12 K/uL    Immature Granulocytes (abs) 0.03 0.00 - 0.11 K/uL    NRBC (Absolute) 0.00 K/uL   Comp Metabolic Panel   Result Value Ref Range    Sodium 136 135 - 145 mmol/L    Potassium 4.1 3.6 - 5.5 mmol/L    Chloride 103 96 - 112 mmol/L    Co2 21 20 - 33 mmol/L    Anion Gap 12.0 7.0 - 16.0    Glucose 111 (H) 65 - 99 mg/dL    Bun 8 8 - 22 mg/dL    Creatinine 0.62 0.50 - 1.40 mg/dL    Calcium 9.1 8.4 - 10.2 mg/dL    AST(SGOT) 18 12 - 45 U/L    ALT(SGPT) 10 2 - 50 U/L    Alkaline Phosphatase 64 30 - 99 U/L    Total Bilirubin 0.7 0.1 - 1.5 mg/dL    Albumin 4.3 3.2 - 4.9 g/dL    Total Protein 7.0 6.0 - 8.2 g/dL    Globulin 2.7 1.9 - 3.5 g/dL    A-G Ratio 1.6 g/dL   LIPASE   Result Value Ref Range    Lipase 16 7 - 58 U/L   ESTIMATED GFR   Result Value Ref Range    GFR If African American >60 >60 mL/min/1.73 m 2    GFR If Non African American >60 >60 mL/min/1.73 m 2        COURSE & MEDICAL DECISION MAKING  Pertinent Labs & Imaging studies reviewed. (See chart for details)  An IV was established.  Differential diagnosis was extensive including pancreatitis hepatitis acute cholecystitis enteritis.  The patient underwent an extensive evaluation.  Here his CBC and metabolic panel are unremarkable.  Specifically he has no leukocytosis or bandemia or evidence of anemia.  No transaminitis or elevation of lipase.  Ultrasound did not demonstrate any acute process such as cholecystitis.  There is an incidental finding of a small hemangioma for which I will refer the patient back to his gastroenterologist and he was made aware of this finding.  We will start him on some Carafate he can  use over-the-counter acid blockers such as Prilosec OTC etc.  Return precautions have been reviewed    FINAL IMPRESSION  1.  Epigastric pain  2.  Gastritis         Electronically signed by: Rodger Dawn M.D., 12/20/2021 9:50 AM

## 2021-12-20 NOTE — ED NOTES
Med rec updated and complete, per [facility] on [address] ([phone])  Allergies reviewed, per pt  Pt reports that he has not taken his insulin for over a month, and took 2 units today @ 0600

## 2021-12-20 NOTE — ED TRIAGE NOTES
"Chief Complaint   Patient presents with   • Abdominal Pain     x 1 year, has followed w/ GI   • N/V     x 1 year, states has followup w/ Endocrinologist next month     /90   Pulse 93   Temp 36.8 °C (98.3 °F) (Temporal)   Resp 15   Ht 1.854 m (6' 1\")   Wt 89.7 kg (197 lb 12 oz)   SpO2 98%   BMI 26.09 kg/m²     Has this patient been vaccinated for COVID NO  If not, would they like to be vaccinated while in the ER if eligible?  NO  Would the patient like to speak with the ERP about the possibility of receiving the COVID vaccine today before making a decision? NO    Pt ambulated to ED by self for c/o ongoing abd pain and N/V \"for a year now.\"  Pt states has followed w/ GI and is now scheduled to see Endo on 01/02/22.  Pt states has stopped taking prescription medications \"because I don't need them.\"        "